# Patient Record
Sex: FEMALE | Race: WHITE
[De-identification: names, ages, dates, MRNs, and addresses within clinical notes are randomized per-mention and may not be internally consistent; named-entity substitution may affect disease eponyms.]

---

## 2019-01-18 ENCOUNTER — HOSPITAL ENCOUNTER (EMERGENCY)
Dept: HOSPITAL 62 - ER | Age: 39
Discharge: HOME | End: 2019-01-18
Payer: MEDICAID

## 2019-01-18 VITALS — DIASTOLIC BLOOD PRESSURE: 69 MMHG | SYSTOLIC BLOOD PRESSURE: 110 MMHG

## 2019-01-18 DIAGNOSIS — R74.8: ICD-10-CM

## 2019-01-18 DIAGNOSIS — Z88.5: ICD-10-CM

## 2019-01-18 DIAGNOSIS — N83.202: Primary | ICD-10-CM

## 2019-01-18 DIAGNOSIS — Z98.51: ICD-10-CM

## 2019-01-18 DIAGNOSIS — Z71.6: ICD-10-CM

## 2019-01-18 DIAGNOSIS — Z87.442: ICD-10-CM

## 2019-01-18 DIAGNOSIS — R10.32: ICD-10-CM

## 2019-01-18 DIAGNOSIS — F17.210: ICD-10-CM

## 2019-01-18 DIAGNOSIS — Z90.49: ICD-10-CM

## 2019-01-18 DIAGNOSIS — D72.829: ICD-10-CM

## 2019-01-18 DIAGNOSIS — R11.0: ICD-10-CM

## 2019-01-18 LAB
ABSOLUTE LYMPHOCYTES# (MANUAL): 1.6 10^3/UL (ref 0.5–4.7)
ABSOLUTE MONOCYTES # (MANUAL): 1.4 10^3/UL (ref 0.1–1.4)
ABSOLUTE NEUTROPHILS# (MANUAL): 12.7 10^3/UL (ref 1.7–8.2)
ADD MANUAL DIFF: YES
ALBUMIN SERPL-MCNC: 4.8 G/DL (ref 3.5–5)
ALP SERPL-CCNC: 155 U/L (ref 38–126)
ALT SERPL-CCNC: 139 U/L (ref 9–52)
ANION GAP SERPL CALC-SCNC: 11 MMOL/L (ref 5–19)
APPEARANCE UR: (no result)
APTT PPP: YELLOW S
AST SERPL-CCNC: 49 U/L (ref 14–36)
BASOPHILS NFR BLD MANUAL: 0 % (ref 0–2)
BILIRUB DIRECT SERPL-MCNC: 0.3 MG/DL (ref 0–0.4)
BILIRUB SERPL-MCNC: 0.8 MG/DL (ref 0.2–1.3)
BILIRUB UR QL STRIP: NEGATIVE
BUN SERPL-MCNC: 5 MG/DL (ref 7–20)
CALCIUM: 9.9 MG/DL (ref 8.4–10.2)
CHLORIDE SERPL-SCNC: 105 MMOL/L (ref 98–107)
CO2 SERPL-SCNC: 21 MMOL/L (ref 22–30)
EOSINOPHIL NFR BLD MANUAL: 0 % (ref 0–6)
ERYTHROCYTE [DISTWIDTH] IN BLOOD BY AUTOMATED COUNT: 13.2 % (ref 11.5–14)
GLUCOSE SERPL-MCNC: 111 MG/DL (ref 75–110)
GLUCOSE UR STRIP-MCNC: NEGATIVE MG/DL
HCT VFR BLD CALC: 40.3 % (ref 36–47)
HGB BLD-MCNC: 13.5 G/DL (ref 12–15.5)
KETONES UR STRIP-MCNC: 80 MG/DL
LIPASE SERPL-CCNC: 76 U/L (ref 23–300)
MCH RBC QN AUTO: 31.9 PG (ref 27–33.4)
MCHC RBC AUTO-ENTMCNC: 33.6 G/DL (ref 32–36)
MCV RBC AUTO: 95 FL (ref 80–97)
MONOCYTES % (MANUAL): 9 % (ref 3–13)
NITRITE UR QL STRIP: NEGATIVE
PH UR STRIP: 7 [PH] (ref 5–9)
PLATELET # BLD: 176 10^3/UL (ref 150–450)
PLATELET CLUMP BLD QL SMEAR: PRESENT
PLATELET COMMENT: ADEQUATE
POTASSIUM SERPL-SCNC: 4.2 MMOL/L (ref 3.6–5)
PROT SERPL-MCNC: 7.4 G/DL (ref 6.3–8.2)
PROT UR STRIP-MCNC: NEGATIVE MG/DL
RBC # BLD AUTO: 4.24 10^6/UL (ref 3.72–5.28)
SEGMENTED NEUTROPHILS % (MAN): 81 % (ref 42–78)
SODIUM SERPL-SCNC: 137.1 MMOL/L (ref 137–145)
SP GR UR STRIP: 1.02
TOTAL CELLS COUNTED BLD: 100
UROBILINOGEN UR-MCNC: 4 MG/DL (ref ?–2)
VARIANT LYMPHS NFR BLD MANUAL: 10 % (ref 13–45)
WBC # BLD AUTO: 15.7 10^3/UL (ref 4–10.5)

## 2019-01-18 PROCEDURE — 81025 URINE PREGNANCY TEST: CPT

## 2019-01-18 PROCEDURE — 36415 COLL VENOUS BLD VENIPUNCTURE: CPT

## 2019-01-18 PROCEDURE — 80053 COMPREHEN METABOLIC PANEL: CPT

## 2019-01-18 PROCEDURE — 76830 TRANSVAGINAL US NON-OB: CPT

## 2019-01-18 PROCEDURE — 96374 THER/PROPH/DIAG INJ IV PUSH: CPT

## 2019-01-18 PROCEDURE — 99406 BEHAV CHNG SMOKING 3-10 MIN: CPT

## 2019-01-18 PROCEDURE — 81001 URINALYSIS AUTO W/SCOPE: CPT

## 2019-01-18 PROCEDURE — 85025 COMPLETE CBC W/AUTO DIFF WBC: CPT

## 2019-01-18 PROCEDURE — 93976 VASCULAR STUDY: CPT

## 2019-01-18 PROCEDURE — 74022 RADEX COMPL AQT ABD SERIES: CPT

## 2019-01-18 PROCEDURE — 83690 ASSAY OF LIPASE: CPT

## 2019-01-18 PROCEDURE — 99284 EMERGENCY DEPT VISIT MOD MDM: CPT

## 2019-01-18 PROCEDURE — 96375 TX/PRO/DX INJ NEW DRUG ADDON: CPT

## 2019-01-18 NOTE — ER DOCUMENT REPORT
ED Medical Screen (RME)





- General


Chief Complaint: Abdominal Pain


Stated Complaint: ABDOMINAL PAIN


Time Seen by Provider: 01/18/19 17:34


Mode of Arrival: Ambulatory


Information source: Patient


Notes: 





This is a 38-year-old female with a history of kidney stones that presents to 

the emergency room with left flank pain radiating into the left groin starting 

approximately 345 this morning.  Patient denies fever, chills.  She does have 

tenderness along the sacrum and the back.  Her upper abdomen is soft and 

nontender.





Past surgical history: Cholecystectomy, bilateral tubal ligation, uterine 

ablation.  Patient is currently on her menses.





Allergies: Morphine (shortness of breath), Percocet.  Patient states she 

tolerates Vicodin.











TRAVEL OUTSIDE OF THE [place] IN LAST 30 DAYS: No





- Related Data


Allergies/Adverse Reactions: 


                                        





morphine Allergy (Mild, Verified 01/18/19 15:38)


   


acetaminophen [From Percocet] Adverse Reaction (Verified 01/18/19 15:38)


   


oxycodone [From Percocet] Adverse Reaction (Verified 01/18/19 15:38)


   











Past Medical History





- Social History


Frequency of alcohol use: None


Drug Abuse: None


Renal/ Medical History: Reports: Hx Kidney Stones.  Denies: Hx Peritoneal 

Dialysis


Psychiatric Medical History: Reports: Hx Bipolar Disorder, Hx Depression - with 

anxiety


Past Surgical History: Reports: Hx Cholecystectomy, Hx Genitourinary Surgery - 

ablation, Hx Tubal Ligation





Physical Exam





- Vital signs


Vitals: 





                                        











Temp Pulse Resp BP Pulse Ox


 


 98.5 F   92   18   105/71   100 


 


 01/18/19 16:09  01/18/19 16:09  01/18/19 16:09  01/18/19 16:09  01/18/19 16:09














Course





- Vital Signs


Vital signs: 





                                        











Temp Pulse Resp BP Pulse Ox


 


 98.5 F   92   18   105/71   100 


 


 01/18/19 16:09  01/18/19 16:09  01/18/19 16:09  01/18/19 16:09  01/18/19 16:09

## 2019-01-18 NOTE — ER DOCUMENT REPORT
ED General





- General


Chief Complaint: Abdominal Pain


Stated Complaint: ABDOMINAL PAIN


Time Seen by Provider: 01/18/19 17:34


Mode of Arrival: Ambulatory


Information source: Patient, Relative, [facility] Records


Notes: 





38-year-old female with bipolar disorder, epilepsy, history of kidney stones 

presents with complaints of left lower quadrant abdominal pain that started over

12 hours prior to arrival.  Patient describes the pain as intermittent, 

stabbing.  She denies any exacerbating or relieving factors.  She denies any 

prior similar symptoms.  Patient describes the pain is radiating to her left 

flank but does not think this is a kidney stone as she has had previously.  

Patient has had nausea secondary to pain without vomiting.  Her last bowel 

movement was this morning.  Patient is passing gas.  She denies any history of 

constipation, black or bloody stools, vaginal discharge, concern for STD,fever, 

chills, chest pain, shortness of breath, dysuria, vomiting.  She is currently 

menstruating.  She is not currently sexually active.


TRAVEL OUTSIDE OF THE [place] IN LAST 30 DAYS: No





- HPI


Onset: This morning


Onset/Duration: Sudden, Intermittent


Quality of pain: Stabbing


Severity: Moderate


Associated symptoms: Nausea.  denies: Chest pain, Diarrhea, Fever, Vomiting, 

Shortness of breath


Exacerbated by: Denies


Relieved by: Denies


Similar symptoms previously: No


Recently seen / treated by doctor: No





- Related Data


Allergies/Adverse Reactions: 


                                        





morphine Allergy (Mild, Verified 01/18/19 15:38)


   


acetaminophen [From Percocet] Adverse Reaction (Verified 01/18/19 15:38)


   


oxycodone [From Percocet] Adverse Reaction (Verified 01/18/19 15:38)


   











Past Medical History





- General


Information source: Patient





- Social History


Smoking Status: Current Every Day Smoker


Cigarette use (# per day): Yes - 12


Smoking Education Provided: Yes - Smoking cessation counseling was provided for 

4 minutes at the bedside 


Frequency of alcohol use: None


Drug Abuse: None


Lives with: Family


Family History: Reviewed & Not Pertinent


Patient has suicidal ideation: No


Patient has homicidal ideation: No


Renal/ Medical History: Reports: Hx Kidney Stones.  Denies: Hx Peritoneal Di

alysis


Psychiatric Medical History: Reports: Hx Bipolar Disorder, Hx Depression - with 

anxiety


Past Surgical History: Reports: Hx Cholecystectomy, Hx Genitourinary Surgery - 

ablation, Hx Tubal Ligation





Review of Systems





- Review of Systems


Notes: 





REVIEW OF SYSTEMS:


CONSTITUTIONAL :  Denies fever,  chills, or sweats.  Denies recent illness. 

Denies weight loss, recent hospitalizations. 


EENT: Denies visual changes, eye pain.  Denies sore throat, oral lesions, 

difficulty swallowing.


CARDIOVASCULAR:  Denies chest pain.  Denies palpitations. Denies lower extremity

edema.


RESPIRATORY:  Denies cough. Denies shortness of breath, wheezing.


GASTROINTESTINAL:  Denies abdominal  distention.  Denies nausea, vomiting, or 

diarrhea.  Denies blood in vomitus, stools, or per rectum.  Denies black, tarry 

stools.  Denies constipation.  


GENITOURINARY:  Denies difficulty urinating, painful urination,  frequency, 

blood in urine, or  vaginal discharge.


MUSCULOSKELETAL:  Denies neck pain or stiffness.  Denies joint pain or swelling.


SKIN:   Denies rash, lesions or sores.


HEMATOLOGIC :   Denies easy bruising or bleeding.


LYMPHATIC:  Denies swollen glands.


NEUROLOGICAL:  Denies confusion or altered mental status.  Denies loss of 

consciousness.  Denies dizziness or lightheadedness.  Denies headache.  Denies 

weakness or paralysis.  Denies problems difficulty with ambulation, slurred spee

ch.  Denies sensory loss, numbness, or tingling.  Denies seizures.


PSYCHIATRIC:  Denies anxiety or stress.  Denies depression, suicidal ideation, 

or homicidal ideation.  Denies visual or auditory hallucinations.








Physical Exam





- Vital signs


Vitals: 


                                        











Temp Pulse Resp BP Pulse Ox


 


 98.5 F   92   18   105/71   100 


 


 01/18/19 16:09  01/18/19 16:09  01/18/19 16:09  01/18/19 16:09  01/18/19 16:09














- Notes


Notes: 





PHYSICAL EXAMINATION:





GENERAL: Well-appearing, well-nourished and in no acute distress.





HEAD: Atraumatic, normocephalic.





EYES: Pupils equal round and reactive to light, extraocular movements intact, 

conjunctiva are normal.





ENT: Nares patent, oropharynx clear without exudates.  Moist mucous membranes.





NECK: Normal range of motion, supple without lymphadenopathy





LUNGS: Breath sounds clear to auscultation bilaterally and equal.  No wheezes 

rales or rhonchi.





HEART: Regular rate and rhythm without murmurs





ABDOMEN: Tenderness with palpation to the left lower inguinal area.  No evidence

of hernia, mass, lymphadenopathy no erythema.  No guarding, no rebound.  No mas

ses appreciated.





Female : deferred





Musculoskeletal: Normal range of motion, no pitting or edema.  No cyanosis.





NEUROLOGICAL: Cranial nerves grossly intact.  Normal speech, normal gait.  

Normal sensory, motor exams





PSYCH: Normal mood, normal affect.





SKIN: Warm, Dry, normal turgor, no rashes or lesions noted.





Course





- Re-evaluation


Re-evalutation: 





01/18/19 23:44





Laboratory











  01/18/19 01/18/19 01/18/19





  18:15 18:15 18:15


 


WBC  Cancelled  


 


RBC  Cancelled  


 


Hgb  Cancelled  


 


Hct  Cancelled  


 


MCV  Cancelled  


 


MCH  Cancelled  


 


MCHC  Cancelled  


 


RDW  Cancelled  


 


Plt Count  Cancelled  


 


Total Counted   


 


Seg Neutrophils %  Cancelled  


 


Seg Neuts % (Manual)   


 


Lymphocytes %  Cancelled  


 


Lymphocytes % (Manual)   


 


Monocytes %  Cancelled  


 


Monocytes % (Manual)   


 


Eosinophils %  Cancelled  


 


Eosinophils % (Manual)   


 


Basophils %  Cancelled  


 


Basophils % (Manual)   


 


Absolute Neutrophils  Cancelled  


 


Abs Neuts (Manual)   


 


Absolute Lymphocytes  Cancelled  


 


Abs Lymphs (Manual)   


 


Absolute Monocytes  Cancelled  


 


Abs Monocytes (Manual)   


 


Absolute Eosinophils  Cancelled  


 


Absolute Eos (Manual)   


 


Absolute Basophils  Cancelled  


 


Abs Basophils (Manual)   


 


Platelet Estimate  Cancelled  


 


Clumped Platelets   


 


Platelet Comment   


 


Sodium   137.1 


 


Potassium   4.2 


 


Chloride   105 


 


Carbon Dioxide   21 L 


 


Anion Gap   11 


 


BUN   5 L 


 


Creatinine   0.66 


 


Est GFR ( Amer)   > 60 


 


Est GFR (Non-Af Amer)   > 60 


 


Glucose   111 H 


 


Calcium   9.9 


 


Total Bilirubin   0.8 


 


Direct Bilirubin   0.3 


 


Neonat Total Bilirubin   Not Reportable 


 


Neonat Direct Bilirubin   Not Reportable 


 


Neonat Indirect Bili   Not Reportable 


 


AST   49 H 


 


ALT   139 H 


 


Alkaline Phosphatase   155 H 


 


Total Protein   7.4 


 


Albumin   4.8 


 


Lipase    76.0


 


Urine Color   


 


Urine Appearance   


 


Urine pH   


 


Ur Specific Gravity   


 


Urine Protein   


 


Urine Glucose (UA)   


 


Urine Ketones   


 


Urine Blood   


 


Urine Nitrite   


 


Urine Bilirubin   


 


Urine Urobilinogen   


 


Ur Leukocyte Esterase   


 


Urine WBC (Auto)   


 


Urine RBC (Auto)   


 


Urine Bacteria (Auto)   


 


Squamous Epi Cells Auto   


 


Urine Mucus (Auto)   


 


Urine Ascorbic Acid   


 


Urine HCG, Qual   


 


Slides for Path Review  Cancelled  














  01/18/19 01/18/19 01/18/19





  19:25 19:25 20:00


 


WBC    15.7 H


 


RBC    4.24


 


Hgb    13.5


 


Hct    40.3


 


MCV    95


 


MCH    31.9


 


MCHC    33.6


 


RDW    13.2


 


Plt Count    176


 


Total Counted    100


 


Seg Neutrophils %    Not Reportable


 


Seg Neuts % (Manual)    81 H


 


Lymphocytes %    Not Reportable


 


Lymphocytes % (Manual)    10 L


 


Monocytes %    Not Reportable


 


Monocytes % (Manual)    9


 


Eosinophils %    Not Reportable


 


Eosinophils % (Manual)    0


 


Basophils %    Not Reportable


 


Basophils % (Manual)    0


 


Absolute Neutrophils    Not Reportable


 


Abs Neuts (Manual)    12.7 H


 


Absolute Lymphocytes    Not Reportable


 


Abs Lymphs (Manual)    1.6


 


Absolute Monocytes    Not Reportable


 


Abs Monocytes (Manual)    1.4


 


Absolute Eosinophils    Not Reportable


 


Absolute Eos (Manual)    0.0


 


Absolute Basophils    Not Reportable


 


Abs Basophils (Manual)    0.0


 


Platelet Estimate   


 


Clumped Platelets    PRESENT


 


Platelet Comment    ADEQUATE


 


Sodium   


 


Potassium   


 


Chloride   


 


Carbon Dioxide   


 


Anion Gap   


 


BUN   


 


Creatinine   


 


Est GFR ( Amer)   


 


Est GFR (Non-Af Amer)   


 


Glucose   


 


Calcium   


 


Total Bilirubin   


 


Direct Bilirubin   


 


Neonat Total Bilirubin   


 


Neonat Direct Bilirubin   


 


Neonat Indirect Bili   


 


AST   


 


ALT   


 


Alkaline Phosphatase   


 


Total Protein   


 


Albumin   


 


Lipase   


 


Urine Color  YELLOW  


 


Urine Appearance  SLIGHTLY-CLOUDY  


 


Urine pH  7.0  


 


Ur Specific Gravity  1.016  


 


Urine Protein  NEGATIVE  


 


Urine Glucose (UA)  NEGATIVE  


 


Urine Ketones  80 H  


 


Urine Blood  MODERATE H  


 


Urine Nitrite  NEGATIVE  


 


Urine Bilirubin  NEGATIVE  


 


Urine Urobilinogen  4.0 H  


 


Ur Leukocyte Esterase  TRACE H  


 


Urine WBC (Auto)  3  


 


Urine RBC (Auto)  2  


 


Urine Bacteria (Auto)  TRACE  


 


Squamous Epi Cells Auto  2  


 


Urine Mucus (Auto)  FEW  


 


Urine Ascorbic Acid  NEGATIVE  


 


Urine HCG, Qual   NEGATIVE 


 


Slides for Path Review   











                                        





Acute Abdomen Series  01/18/19 20:44


IMPRESSION:


 


Possible small bowel ileus.


 


 


copyright 2011 [employer]- All Rights Reserved


 








Transvaginal US  01/18/19 20:44


IMPRESSION:


 


1.8 cm left ovarian cyst. No follow-up imaging is recommended. 


 











                                        











Temp Pulse Resp BP Pulse Ox


 


 98.8 F   84   20   110/69   98 


 


 01/18/19 23:39  01/18/19 23:39  01/18/19 23:39  01/18/19 23:39  01/18/19 23:39








38-year-old female presents with complaints of left lower quadrant abdominal 

pain that started this morning.  Patient describes it as sharp, intermittent and

associated with nausea but no vomiting.  Patient has had normal bowel movements,

is passing gas.  Previous surgical history of cholecystectomy.  Vital signs 

reviewed and within normal limits.  Patient does not appear toxic or dehydrated.

 She is in no acute distress.  Acute abdominal series was obtained and read 

possible small bowel ileus but patient again is having bowel movements and is 

without abdominal distention or vomiting.  Transvaginal ultrasound was performed

and did show a 1.8 cm left ovarian cysts without evidence of torsion.  Patient 

does have a leukocytosis of 15.  CMP unremarkable except for mildly elevated 

liver enzymes which were discussed with the patient.  Lipase within normal 

limits.  Urinalysis shows blood but patient is currently menstruating.  Patient 

did receive IV fluids, Toradol, Zofran and reports significant improvement in 

her pain.  Patient will be discharged home with prescription for Naprosyn and 

Zofran.  Patient advised that she should be seen by her primary care physician 

in 2-3 weeks for repeat LFTs.  Advised to maintain a clear liquid diet for the 

next 24 hours and return in 12-24 hours if still experiencing significant pain. 

Patient was evaluated and treated as appropriate for the patient's presenting 

symptoms and complaint, with consideration of any critical or life threatening 

conditions that may be associated with their obtained history and exam as noted 

above. All results were discussed with patient and her mother who is at the 

bedside.  Patient provided the opportunity to ask questions, and express 

concerns. Patient was educated on treatments based on their presumed diagnosis 

as noted above.  At this time we will discharge the patient with return 

precautions and follow-up recommendations.  Verbal discharge instructions given 

a the bedside. Medication warnings reviewed. Patient is in agreement with this 

plan and has verbalized understanding of return precautions. 





After careful consideration I feel that that patient can be safely discharged 

from the emergency department,  they were advised to followup with a primary 

care physician in 2-3 days. 








Dictation on this chart was performed using voice recognition software and may 

result in unintended grammatical, spelling, syntax or errors.

















- Vital Signs


Vital signs: 


                                        











Temp Pulse Resp BP Pulse Ox


 


 98.8 F   84   20   110/69   98 


 


 01/18/19 23:39  01/18/19 23:39  01/18/19 23:39  01/18/19 23:39  01/18/19 23:39














- Laboratory


Result Diagrams: 


                                 01/18/19 20:00





                                 01/18/19 18:15


Laboratory results interpreted by me: 


                                        











  01/18/19 01/18/19 01/18/19





  18:15 19:25 20:00


 


WBC    15.7 H


 


Seg Neuts % (Manual)    81 H


 


Lymphocytes % (Manual)    10 L


 


Abs Neuts (Manual)    12.7 H


 


Carbon Dioxide  21 L  


 


BUN  5 L  


 


Glucose  111 H  


 


AST  49 H  


 


ALT  139 H  


 


Alkaline Phosphatase  155 H  


 


Urine Ketones   80 H 


 


Urine Blood   MODERATE H 


 


Urine Urobilinogen   4.0 H 


 


Ur Leukocyte Esterase   TRACE H 














- Diagnostic Test


Radiology reviewed: Image reviewed, Reports reviewed





Discharge





- Discharge


Clinical Impression: 


 Left lower quadrant abdominal pain, Left ovarian cyst, Elevated liver enzymes





Leukocytosis


Qualifiers:


 Leukocytosis type: unspecified Qualified Code(s): D72.829 - Elevated white 

blood cell count, unspecified





Condition: Good


Disposition: HOME, SELF-CARE


Instructions:  Abdominal Pain ([facility]), Liver Function Abnormality ([facility]), Ovarian 

Cyst ([facility])


Additional Instructions: 


Your ultrasound showed a left ovarian cyst which could be the cause of your left

lower quadrant abdominal pain.  Your labs did show a mild elevation in your 

liver enzymes.  Please follow-up with your primary care physician in 2-3 weeks 

for repeat lab work.  Please return to the emergency  in 12-24 hours if you are 

still experiencing abdominal pain.











Follow up with your [id number] hours  for further care or return to the ED

IMMEDIATELY if symptoms worsen or you have any concerns.  If you cannot afford 

to follow up with your primary care physician a list of low cost clinics have 

been provided at the end of your discharge papers as well.











Most prescribed medications have multiple side effects.  The safest thing to do 

is when filling your prescription  speak to your pharmacist regarding possible 

interactions with your normal home medications and over the counter medications 

such as Ibuprofen, Tylenol, Benadryl.  If you experience any symptoms that cause

you discomfort or concern you should discontinue the medication immediately and 

return to the emergency room or call your primary care physician.


Prescriptions: 


Naproxen 500 mg PO Q12H PRN #14 tablet


 PRN Reason: Abdominal Cramping


Forms:  Return to Work

## 2019-01-18 NOTE — RADIOLOGY REPORT (SQ)
EXAM DESCRIPTION: 



XR ABDOMEN SUPINE AND ERECT WITH CHEST (ABD ACUTE SERIES)



COMPLETED DATE/TME:  01/18/2019 20:44



CLINICAL HISTORY: 



38 years, Female, LLQ pain



COMPARISON:

None.



NUMBER OF VIEWS:





TECHNIQUE:





LIMITATIONS:

None.



FINDINGS:



There are multiple loops of normal caliber, but gas containing

small bowel, possibly an ileus.



No free air.



There are no abnormal calcifications.



The chest is unremarkable.



IMPRESSION:



Possible small bowel ileus.

 



copyright 2011 [employer]- All Rights Reserved

## 2019-01-18 NOTE — RADIOLOGY REPORT (SQ)
US PELVIS



EXAM DATE: 01/18/2019 20:44



HISTORY: Left lower quadrant pain.



COMPARISON: None.



TECHNIQUE: Grayscale, color Doppler, and spectral Doppler

ultrasound images of the pelvis were obtained.



FINDINGS: 



The uterus is anteverted and measures 8.4 x 5.0 x 4.2 cm. The

endometrium measures 1.1 cm. The cervix measures 3 cm in length.

The right ovary measures 3.7 x 1.2 x 1.2 cm and the left ovary

measures 3.0 x 1.6 x 1.6 cm. There is a 1.8 x 1.8 x 1.3 cm left

ovarian cyst. No pelvic free fluid is identified.



IMPRESSION:



1.8 cm left ovarian cyst. No follow-up imaging is recommended.

## 2020-06-01 ENCOUNTER — HOSPITAL ENCOUNTER (EMERGENCY)
Dept: HOSPITAL 62 - ER | Age: 40
Discharge: HOME | End: 2020-06-01
Payer: SELF-PAY

## 2020-06-01 VITALS — SYSTOLIC BLOOD PRESSURE: 128 MMHG | DIASTOLIC BLOOD PRESSURE: 80 MMHG

## 2020-06-01 DIAGNOSIS — F17.210: ICD-10-CM

## 2020-06-01 DIAGNOSIS — R10.30: ICD-10-CM

## 2020-06-01 DIAGNOSIS — Z88.6: ICD-10-CM

## 2020-06-01 DIAGNOSIS — R10.2: Primary | ICD-10-CM

## 2020-06-01 LAB
APPEARANCE UR: (no result)
APTT PPP: YELLOW S
BILIRUB UR QL STRIP: NEGATIVE
GLUCOSE UR STRIP-MCNC: NEGATIVE MG/DL
KETONES UR STRIP-MCNC: NEGATIVE MG/DL
NITRITE UR QL STRIP: NEGATIVE
PH UR STRIP: 5 [PH] (ref 5–9)
PROT UR STRIP-MCNC: NEGATIVE MG/DL
SP GR UR STRIP: 1.03
UROBILINOGEN UR-MCNC: NEGATIVE MG/DL (ref ?–2)

## 2020-06-01 PROCEDURE — 81025 URINE PREGNANCY TEST: CPT

## 2020-06-01 PROCEDURE — 81001 URINALYSIS AUTO W/SCOPE: CPT

## 2020-06-01 PROCEDURE — 99283 EMERGENCY DEPT VISIT LOW MDM: CPT

## 2020-06-01 NOTE — ER DOCUMENT REPORT
ED General





- General


Chief Complaint: Lower Abdominal Pain


Stated Complaint: LOWER ABDOMINAL PAIN


Time Seen by Provider: 06/01/20 15:29


Primary Care Provider: 


STACI TUCKER FNP-C [Primary Care Provider] - Follow up as needed


Notes: 





39-year-old female with a history of endometriosis and hyperplasia status post 

ablation in [place] with recurrent discharge menorrhea metromenorrhagia presents

with left lower quadrant dental pain and worsening generalized felicia cramping 

for about 4 days.  No bleeding.  Her periods become irregular lately.  Not 

sexually active no discharge no fever no upper abdominal pain no diarrhea no 

vomiting.  Took ibuprofen normally takes it but is not working anymore.  Has 

allergies to several narcotics.


TRAVEL OUTSIDE OF THE [place] IN LAST 30 DAYS: No





- Related Data


Allergies/Adverse Reactions: 


                                        





morphine Allergy (Mild, Verified 01/18/19 15:38)


   


acetaminophen [From Percocet] Adverse Reaction (Verified 01/18/19 15:38)


   


oxycodone [From Percocet] Adverse Reaction (Verified 01/18/19 15:38)


   











Past Medical History





- General


Information source: Patient





- Social History


Smoking Status: Never Smoker


Cigarette use (# per day): Yes


Smoking Education Provided: Yes - The patient ED visit today was directly 

related to their abuse of tobacco. 


Family History: Reviewed & Not Pertinent


Renal/ Medical History: Reports: Hx Kidney Stones.  Denies: Hx Peritoneal 

Dialysis


Psychiatric Medical History: Reports: Hx Bipolar Disorder, Hx Depression - with 

anxiety


Past Surgical History: Reports: Hx Cholecystectomy, Hx Genitourinary Surgery - 

ablation, Hx Tubal Ligation





Review of Systems





- Review of Systems


Notes: 





REVIEW OF SYSTEMS





GEN: Denies fever, chills, weight loss


ENT: Denies sore throat, nasal discharge, ear pain


EYES: Denies blurry vision, eye pain, discharge


CV: Denies chest pain, palpitations, edema


RESP: Denies cough, shortness of breath, wheezing


GI: HPI


MSK: Denies joint pain/swelling, edema, 


SKIN: Denies rash, skin lesions


LYMPH: Denies swollen glands/lymph nodes


NEURO: Denies headache, focal weakness or numbness, dizziness


PSYCH: Denies depression, suicidal or homicidal ideation








PHYSICAL EXAMINATION





General: No acute distress, well-nourished


Head: Atraumatic, normocephalic


ENT: Mouth normal, oropharynx moist, no exudates or tonsillar enlargement


Eyes: Conjunctiva normal, pupils equal, lids normal


Neck: No JVD, supple, no guarding


CVS: Normal rate, regular rhythm, no murmurs


Resp: No resp distress, equal and normal breath sounds bilaterally


GI: Nondistended, soft, no tenderness to palpation, no rebound or guarding


Ext: No deformities, no edema, normal range of motion in upper and lower ext


Back: No CVA or midline TTP


Skin: No rash, warm


Lymphatic: No lymphadeopathy noted


Neuro: Awake, alert.  Face symmetric.  GCS 15.





Physical Exam





- Vital signs


Vitals: 


                                        











Temp Pulse Resp BP Pulse Ox


 


 98.3 F   85   18   138/85 H  100 


 


 06/01/20 15:09  06/01/20 15:09  06/01/20 15:09  06/01/20 15:09  06/01/20 15:09














Course





- Re-evaluation


Re-evalutation: 





06/01/20 15:49


Nontender recurrent abdominal pain in a patient with known gynecologic pathology

and unlikely to be pregnant


No risk factors for STD


No right-sided tenderness or pain doubt appendectomy doubt need for labs


IM Dilaudid, p.o. Reglan, pregnancy test and UA


06/01/20 15:49


Although ovarian cyst is a possibility management would not change based on 

ultrasound so does not need pelvic ultrasound today


06/01/20 18:12


UA contaminated not pregnant.  Pain better with meds.  Stable for discharge 

follow-up with women's health.  I have discussed with the patient there likely 

diagnosis, aftercare plan, follow-up plans and my usual and customary return 

precautions.  They verbalized understanding of this.





- Vital Signs


Vital signs: 


                                        











Temp Pulse Resp BP Pulse Ox


 


 98.3 F   80   16   128/80 H  100 


 


 06/01/20 16:15  06/01/20 16:15  06/01/20 16:15  06/01/20 16:15  06/01/20 16:15














- Laboratory


Laboratory results interpreted by me: 


                                        











  06/01/20





  15:35


 


Urine Blood  SMALL H


 


Ur Leukocyte Esterase  MODERATE H














Discharge





- Discharge


Clinical Impression: 


 Pelvic pain





Condition: Good


Disposition: HOME, SELF-CARE


Instructions:  Pelvic Pain ([facility])


Additional Instructions: 


[facility]


Prescriptions: 


Naproxen 500 mg PO BIDP PRN #60 tablet


 PRN Reason: 


Forms:  Return to Work


Referrals: 


STACI TUCKER, FNP-C [Primary Care Provider] - Follow up as needed